# Patient Record
Sex: FEMALE | Race: BLACK OR AFRICAN AMERICAN | Employment: PART TIME | URBAN - METROPOLITAN AREA
[De-identification: names, ages, dates, MRNs, and addresses within clinical notes are randomized per-mention and may not be internally consistent; named-entity substitution may affect disease eponyms.]

---

## 2019-02-26 ENCOUNTER — OFFICE VISIT (OUTPATIENT)
Dept: OBGYN | Facility: CLINIC | Age: 39
End: 2019-02-26
Payer: COMMERCIAL

## 2019-02-26 VITALS
HEART RATE: 98 BPM | SYSTOLIC BLOOD PRESSURE: 115 MMHG | DIASTOLIC BLOOD PRESSURE: 78 MMHG | TEMPERATURE: 98.2 F | WEIGHT: 127.5 LBS | OXYGEN SATURATION: 100 %

## 2019-02-26 DIAGNOSIS — N89.8 VAGINAL DISCHARGE: Primary | ICD-10-CM

## 2019-02-26 DIAGNOSIS — R39.89 ABNORMAL URINE COLOR: ICD-10-CM

## 2019-02-26 LAB
ALBUMIN UR-MCNC: NEGATIVE MG/DL
APPEARANCE UR: NORMAL
BACTERIA #/AREA URNS HPF: ABNORMAL /HPF
BILIRUB UR QL STRIP: NEGATIVE
COLOR UR AUTO: NORMAL
GLUCOSE UR STRIP-MCNC: NEGATIVE MG/DL
HGB UR QL STRIP: NEGATIVE
KETONES UR STRIP-MCNC: NEGATIVE MG/DL
LEUKOCYTE ESTERASE UR QL STRIP: NEGATIVE
NITRATE UR QL: NEGATIVE
NON-SQ EPI CELLS #/AREA URNS LPF: ABNORMAL /LPF
PH UR STRIP: 5.5 PH (ref 5–7)
RBC #/AREA URNS AUTO: ABNORMAL /HPF
SOURCE: NORMAL
SP GR UR STRIP: 1 (ref 1–1.03)
SPECIMEN SOURCE: NORMAL
UROBILINOGEN UR STRIP-MCNC: NORMAL MG/DL (ref 0–2)
WBC #/AREA URNS AUTO: ABNORMAL /HPF
WET PREP SPEC: NORMAL

## 2019-02-26 PROCEDURE — 81001 URINALYSIS AUTO W/SCOPE: CPT | Performed by: OBSTETRICS & GYNECOLOGY

## 2019-02-26 PROCEDURE — 99203 OFFICE O/P NEW LOW 30 MIN: CPT | Performed by: OBSTETRICS & GYNECOLOGY

## 2019-02-26 PROCEDURE — 87210 SMEAR WET MOUNT SALINE/INK: CPT | Performed by: OBSTETRICS & GYNECOLOGY

## 2019-02-26 SDOH — HEALTH STABILITY: MENTAL HEALTH: HOW OFTEN DO YOU HAVE A DRINK CONTAINING ALCOHOL?: NEVER

## 2019-02-26 NOTE — PROGRESS NOTES
"Subjective  39 year old non-pregnant female presents today complaining of a possible yeast infection.  Patient had some white, clumpy vaginal discharge on Jan 20th.  She tried over the counter Monistat which helped after a few days.  She urinates now and can see \"cloudy\" discharge in the toilet.  No itching.  No odor.  Patient is sexually active.  She is actively trying to get pregnant.  No pregnancies.  No dysuria.  No fever or chills.  I recommended she schedule an office visit to discuss the infertility.          ROS: 10 point ROS neg other than the symptoms noted above in the HPI.  History reviewed. No pertinent past medical history.  History reviewed. No pertinent surgical history.  History reviewed. No pertinent family history.  Social History     Tobacco Use     Smoking status: Never Smoker     Smokeless tobacco: Never Used   Substance Use Topics     Alcohol use: No     Frequency: Never         Objective  Vitals: /78   Pulse 98   Temp 98.2  F (36.8  C) (Oral)   Wt 57.8 kg (127 lb 8 oz)   LMP 02/13/2019   SpO2 100%   BMI= There is no height or weight on file to calculate BMI.    General appearance=well developed, well-nourished female  Psych=mood is stable  Skin=no rashes or lesions seen  Neck=overall appearance is normal  Thyroid=no enlargement  Lungs=non-labored breathing, no use of accessory muscles   Abd=soft, Nontender/nondistended, no masses, no signs of hernias, no evidence of hepatosplenomegaly  PELVIC:    External genitalia: normal without lesions or masses  Urethral meatus: no lesions or prolapse noted, normal size  Urethra: no masses, non tender  Bladder: non tender, no fullness  Vagina: normal mucosa and rugae, minimal discharge, wet prep obtained  Cervix: normal without lesion, no cervical motion tenderness, healthy, nulliparous  Uterus: small, mobile, nontender.  Adnexa: non tender, without masses  Rectal: deffered  Ext=no clubbing or cyanosis, no swelling      Assessment  1.)  Vaginal " discharge  2.)  Abnormal urine color  3.)  Inferility      Plan  1.)  Wet prep  2.)  U/A  3.)  Follow-up for infertility visit      20 minutes was spent face to face with the patient today discussing her history, diagnosis, and follow-up plan as noted above.  Over 50% of the visit was spent in counseling and coordination of care.        Nursing notes read and reviewed    Aparna Naik

## 2019-02-26 NOTE — PATIENT INSTRUCTIONS
If you have any questions regarding your visit, Please contact your care team.    Women s Health CLINIC HOURS TELEPHONE NUMBER   Aparna Naik M.D.    Michelle Calderón -         Monday-[facility]  7:00 am - 5 pm Monday's Tuesday- [facility]  8:00am- 5 pm  Wednesday- Off  Thursday- Offf Friday-Am [place], and [facility]  [place] 8:00-11:30 AM  [place] 1:00-5:00 PM [facility]  [address] [place] [address]  [phone] ask for [facility]    Imaging [phone]    [facility]  [address]  [place], [place] [address] [phone]  Imaging [phone]    [facility]  [address] [place], [place] [id number] [phone]  Imaging Scheduling - [phone]     Urgent Care locations:    [facility] Saturday and Sunday   9 am - 5 pm    Monday-Friday   12 pm - 8 pm  Saturday and Sunday   9 am - 5 pm   [phone]       If you need a medication refill, please contact your pharmacy. Please allow 3 business days for your refill to be completed.  As always, Thank you for trusting us with your healthcare needs!

## 2019-03-28 ENCOUNTER — OFFICE VISIT (OUTPATIENT)
Dept: OBGYN | Facility: CLINIC | Age: 39
End: 2019-03-28
Payer: COMMERCIAL

## 2019-03-28 VITALS
HEART RATE: 83 BPM | DIASTOLIC BLOOD PRESSURE: 76 MMHG | SYSTOLIC BLOOD PRESSURE: 114 MMHG | WEIGHT: 123.9 LBS | TEMPERATURE: 98.2 F

## 2019-03-28 DIAGNOSIS — Z31.41 FERTILITY TESTING: Primary | ICD-10-CM

## 2019-03-28 LAB
ERYTHROCYTE [DISTWIDTH] IN BLOOD BY AUTOMATED COUNT: 17.5 % (ref 10–15)
ESTRADIOL SERPL-MCNC: 66 PG/ML
FSH SERPL-ACNC: 6.8 IU/L
HCT VFR BLD AUTO: 49.2 % (ref 35–47)
HGB BLD-MCNC: 15.9 G/DL (ref 11.7–15.7)
LH SERPL-ACNC: 11.7 IU/L
MCH RBC QN AUTO: 28.9 PG (ref 26.5–33)
MCHC RBC AUTO-ENTMCNC: 32.3 G/DL (ref 31.5–36.5)
MCV RBC AUTO: 89 FL (ref 78–100)
PLATELET # BLD AUTO: 198 10E9/L (ref 150–450)
PROGEST SERPL-MCNC: 1.8 NG/ML
PROLACTIN SERPL-MCNC: 59 UG/L (ref 3–27)
RBC # BLD AUTO: 5.51 10E12/L (ref 3.8–5.2)
TSH SERPL DL<=0.005 MIU/L-ACNC: 1.24 MU/L (ref 0.4–4)
WBC # BLD AUTO: 10.3 10E9/L (ref 4–11)

## 2019-03-28 PROCEDURE — 84146 ASSAY OF PROLACTIN: CPT | Performed by: OBSTETRICS & GYNECOLOGY

## 2019-03-28 PROCEDURE — 84403 ASSAY OF TOTAL TESTOSTERONE: CPT | Performed by: OBSTETRICS & GYNECOLOGY

## 2019-03-28 PROCEDURE — 82670 ASSAY OF TOTAL ESTRADIOL: CPT | Performed by: OBSTETRICS & GYNECOLOGY

## 2019-03-28 PROCEDURE — 82627 DEHYDROEPIANDROSTERONE: CPT | Performed by: OBSTETRICS & GYNECOLOGY

## 2019-03-28 PROCEDURE — 84144 ASSAY OF PROGESTERONE: CPT | Performed by: OBSTETRICS & GYNECOLOGY

## 2019-03-28 PROCEDURE — 85027 COMPLETE CBC AUTOMATED: CPT | Performed by: OBSTETRICS & GYNECOLOGY

## 2019-03-28 PROCEDURE — 84443 ASSAY THYROID STIM HORMONE: CPT | Performed by: OBSTETRICS & GYNECOLOGY

## 2019-03-28 PROCEDURE — 87591 N.GONORRHOEAE DNA AMP PROB: CPT | Performed by: OBSTETRICS & GYNECOLOGY

## 2019-03-28 PROCEDURE — 99214 OFFICE O/P EST MOD 30 MIN: CPT | Performed by: OBSTETRICS & GYNECOLOGY

## 2019-03-28 PROCEDURE — 83001 ASSAY OF GONADOTROPIN (FSH): CPT | Performed by: OBSTETRICS & GYNECOLOGY

## 2019-03-28 PROCEDURE — 84270 ASSAY OF SEX HORMONE GLOBUL: CPT | Performed by: OBSTETRICS & GYNECOLOGY

## 2019-03-28 PROCEDURE — 87491 CHLMYD TRACH DNA AMP PROBE: CPT | Performed by: OBSTETRICS & GYNECOLOGY

## 2019-03-28 PROCEDURE — 83002 ASSAY OF GONADOTROPIN (LH): CPT | Performed by: OBSTETRICS & GYNECOLOGY

## 2019-03-28 PROCEDURE — 36415 COLL VENOUS BLD VENIPUNCTURE: CPT | Performed by: OBSTETRICS & GYNECOLOGY

## 2019-03-28 NOTE — PATIENT INSTRUCTIONS
Understanding Fertility Problems: Improving Your Chances  Your doctor may suggest some simple methods to help you get pregnant. Most focus on predicting ovulation--the time when sex has the best chance of success. Your doctor may also advise working on other factors that can affect fertility.  Predicting Ovulation  Conception is only possible when a woman is ovulating. One signal of ovulation is a surge in the hormone LH. Other signals include changes in body temperature and changes in cervical mucus.  Ovulation Predictor Kits  One of the best signals of ovulation is a sudden increase in the hormone LH. A simple urine test called an ovulation predictor kit (available at most [facility]) can help pinpoint this surge. Have sex the day you notice the surge. Keep having sex daily over the next 3 days, or as often as your doctor suggests.  Body Temperature Changes  A woman s resting temperature (basal body temperature) often drops just before ovulation. Your doctor may ask you to take your temperature each morning to pinpoint this change. Have sex the day your temperature drops. Keep having sex daily until the day after your temperature rises again.  Cervical Mucus Changes  Shortly before ovulation a woman s cervical mucus gets clear and stretchy. The mucus also increases in quantity. This makes it easier for sperm to travel through the cervix. Not all women notice these changes. But if you do, begin having sex daily until the mucus thickens again.  Working on Other Factors That Affect Fertility  Certain lifestyle and health habits can affect fertility. Be sure to talk with your doctor about these issues. You may be able to make some simple changes to improve your chances of conception. Keep in mind, though, that it can take time for healthy changes to improve your fertility.  Weight Problems   Being overweight or underweight can affect hormone levels. In women, this can impair ovulation. In men, obesity can decrease  sperm count.  Medications, Supplements, and Herbal Remedies  Medications, supplements, and herbal remedies can affect hormone levels in men and women. They can also affect the quantity and quality of sperm. Be sure to tell your doctor about any substances you take.  Sexually Transmitted Diseases  Sexually transmitted diseases (STDs) can scar the reproductive organs in both men and women. If you ve had an STD, be sure to tell your doctor.  Testicular Heat  A man s testicles are normally a few degrees cooler than the rest of his body. When the testicles are too warm, sperm production may decline. Try to avoid excess heat from things like hot tubs and saunas.  Smoking, Alcohol, and Drugs  Smoking can affect estrogen levels and decrease egg production in women. Men who smoke may have lowered sperm count. Excessive alcohol or drug use can also decrease fertility in both men and women.  Chemical Exposure  Certain chemicals can affect hormone levels. Talk with your doctor if you re regularly exposed to strong chemicals. You should also ask about lubricants used during sex. Some types can be toxic to sperm.  Other Factors  Excessive exercise can decrease hormone production. It can also cause irregular menstruation in women. Ongoing stress is another factor that may affect fertility and cause ovulation problems.

## 2019-03-28 NOTE — PROGRESS NOTES
Subjective   39 year old G0 non-pregnant female presents today complaining of infertility. Patient and  have been actively trying to get pregnant for the last 6 months.  Patient has not been doing ovulation kits.  She did download an nery which tells her when she ovulated.  Patient's  has never fathered any children. No tobacco abuse. No semen analysis or HSG has been done. No history of STD's. Patient's menses are monthly. No ETOH use-[employer]. We discussed the normal menstrual cycle. We discussed timing ovulation kits and intercourse. Will obtain day 3 and 21 labs. Will start Clomid if all labs and studies are normal. Patient verbalizes understanding of plan.  Last menstrual period was 3/15.      Male factor:    General health:  Healthy  Father of prior pregnancies:  No  Boxers:  Yes  Briefs:  No  Semen Analysis:  No                     Occupation of Patient:  [employer]  Chemical or toxin exposure at home or work:  No    Occupation of Partner:  [employer]  Chemical or toxin exposure at home or work:  No    Patient's past medical history:  Medical:  No  Surgical:  No  Medications:  Prenatal vitamin  Allergies:  No      ROS: 10 point ROS neg other than the symptoms noted above in the HPI.  Past Medical History   Diagnosis Date     NO ACTIVE PROBLEMS      Past Surgical History   Procedure Laterality Date     No history of surgery       Family History   Problem Relation Age of Onset     Stroke Mother      42     Hypertension Father      Diabetes Mother      Prostatic CA Paternal Grandfather      Cancer Paternal Aunt      Cervical CA     Cancer Paternal Grandmother      Cervical CA     Thyroid Mother      Circulatory Mother      History   Substance Use Topics     Smoking status: Never Smoker      Smokeless tobacco: Never Used     Alcohol Use: Yes      Comment: On occasion/ Rare         Objective  Vitals: /76  Pulse 76  Wt 126 lb (57.153 kg)  BMI 20.97 kg/m2  LMP 07/10/2014  BMI= Body  mass index is 20.97 kg/(m^2).     Exam done 2/26/19:  General appearance=well developed, well-nourished female  Psych=mood is stable  Skin=no rashes or lesions seen  Neck=overall appearance is normal  Thyroid=no enlargement  Lungs=non-labored breathing, no use of accessory muscles   Abd=soft, Nontender/nondistended, no masses, no signs of hernias, no evidence of hepatosplenomegaly  PELVIC:    External genitalia: normal without lesions or masses  Urethral meatus: no lesions or prolapse noted, normal size  Urethra: no masses, non tender  Bladder: non tender, no fullness  Vagina: normal mucosa and rugae, minimal discharge, wet prep obtained  Cervix: normal without lesion, no cervical motion tenderness, healthy, nulliparous  Uterus: small, mobile, nontender.  Adnexa: non tender, without masses  Rectal: deffered  Ext=no clubbing or cyanosis, no swelling      Assessment  1.)  Infertility management      Plan  1.)  Infertility labs today  2.)  Day 3 and 21 labs  3.)  Semen analysis and HSG=semen information sent to patient   4.)  Start prenatal vitamin    35 minutes was spent face to face with the patient today discussing her history, diagnosis, and follow-up plan as noted above.  Over 50% of the visit was spent in counseling and coordination of care.    Total Visit Time: 35 minutes including counseling and physical exam    Aparna Naik

## 2019-03-29 DIAGNOSIS — E22.1 HYPERPROLACTINEMIA (H): Primary | ICD-10-CM

## 2019-03-29 LAB
C TRACH DNA SPEC QL NAA+PROBE: NEGATIVE
DHEA-S SERPL-MCNC: 140 UG/DL (ref 35–430)
N GONORRHOEA DNA SPEC QL NAA+PROBE: NEGATIVE
SPECIMEN SOURCE: NORMAL
SPECIMEN SOURCE: NORMAL

## 2019-04-02 LAB
SHBG SERPL-SCNC: 53 NMOL/L (ref 30–135)
TESTOST FREE SERPL-MCNC: 0.32 NG/DL (ref 0.13–0.92)
TESTOST SERPL-MCNC: 25 NG/DL (ref 8–60)

## 2019-04-15 ENCOUNTER — TELEPHONE (OUTPATIENT)
Dept: OBGYN | Facility: CLINIC | Age: 39
End: 2019-04-15

## 2019-04-15 NOTE — TELEPHONE ENCOUNTER
Received phone call from patient- patient states was to come in on day 3 for clomid check.  Patient started cycle Friday around 6 pm.  Today is day 4.  Forwarded to provider to advise- have patient be seen tomorrow? Day 5?

## 2019-04-15 NOTE — TELEPHONE ENCOUNTER
Yes, patient should be seen tomorrow.  She will have to be worked in and let her know she will have to wait as I have a full schedule.    Aparna Naik

## 2019-04-16 ENCOUNTER — OFFICE VISIT (OUTPATIENT)
Dept: OBGYN | Facility: CLINIC | Age: 39
End: 2019-04-16
Payer: COMMERCIAL

## 2019-04-16 VITALS
OXYGEN SATURATION: 99 % | TEMPERATURE: 98.1 F | WEIGHT: 122 LBS | DIASTOLIC BLOOD PRESSURE: 66 MMHG | HEART RATE: 84 BPM | SYSTOLIC BLOOD PRESSURE: 101 MMHG

## 2019-04-16 DIAGNOSIS — Z31.41 FERTILITY TESTING: Primary | ICD-10-CM

## 2019-04-16 DIAGNOSIS — R79.89 ELEVATED PROLACTIN LEVEL: ICD-10-CM

## 2019-04-16 PROCEDURE — 99213 OFFICE O/P EST LOW 20 MIN: CPT | Performed by: OBSTETRICS & GYNECOLOGY

## 2019-04-16 NOTE — PATIENT INSTRUCTIONS
If you have any questions regarding your visit, Please contact your care team.    Women s Health CLINIC HOURS TELEPHONE NUMBER   Aparna Naik M.D.    Michelle Calderón -         Monday-[facility]  7:00 am - 5 pm Monday's Tuesday- [facility]  8:00am- 5 pm  Wednesday- Off  Thursday- Offf Friday-Am [place], and [facility]  [place] 8:00-11:30 AM  [place] 1:00-5:00 PM [facility]  [address] [place] [address]  [phone] ask for [facility]    Imaging [phone]    [facility]  [address]  [place], [place] [address] [phone]  Imaging [phone]    [facility]  [address] [place], [place] [id number] [phone]  Imaging Scheduling - [phone]     Urgent Care locations:    [facility] Saturday and Sunday   9 am - 5 pm    Monday-Friday   12 pm - 8 pm  Saturday and Sunday   9 am - 5 pm   [phone]       If you need a medication refill, please contact your pharmacy. Please allow 3 business days for your refill to be completed.  As always, Thank you for trusting us with your healthcare needs!

## 2019-04-16 NOTE — PROGRESS NOTES
Subjective  This 40 y/o female, , LMP was 28 days ago, presents to discuss starting Clomid.  Patient had an abnormal fasting prolactin level.  I had recommended patient get a brain MRI.  She has not done this yet or scheduled it.  We discussed how I need those results before starting Clomid.  She will likely need to see Endocrinology after the results are back as well. She has not done any ovulation kits.  I recommended she buy those and start doing those.  We discussed the importance of this for when/if she is started on Clomid.  Her last menstrual period was .  We reviewed timing of everything again.         ROS: 10 point ROS neg other than the symptoms noted above in the HPI.  History reviewed. No pertinent past medical history.  History reviewed. No pertinent surgical history.  History reviewed. No pertinent family history.  Social History     Tobacco Use     Smoking status: Never Smoker     Smokeless tobacco: Never Used   Substance Use Topics     Alcohol use: No     Frequency: Never         Objective  Vitals: There were no vitals taken for this visit.  BMI= There is no height or weight on file to calculate BMI.    General appearance=well developed, well-nourished female  Psych=mood is stable      Assessment  1.)  Fertility testing  2.)  Hyperprolactinemia      Plan  1.)  Brain MRI scheduled for patient   2.)  Follow-up 4-6 weeks      20 minutes was spent face to face with the patient today discussing her history, diagnosis, and follow-up plan as noted above.  Over 50% of the visit was spent in counseling and coordination of care.        Nursing notes read and reviewed    Aparna Naik

## 2019-04-19 ENCOUNTER — ANCILLARY PROCEDURE (OUTPATIENT)
Dept: MRI IMAGING | Facility: CLINIC | Age: 39
End: 2019-04-19
Attending: OBSTETRICS & GYNECOLOGY
Payer: COMMERCIAL

## 2019-04-19 DIAGNOSIS — E22.1 HYPERPROLACTINEMIA (H): ICD-10-CM

## 2019-04-19 PROCEDURE — 70553 MRI BRAIN STEM W/O & W/DYE: CPT | Performed by: RADIOLOGY

## 2019-04-19 PROCEDURE — A9585 GADOBUTROL INJECTION: HCPCS | Mod: JW | Performed by: OBSTETRICS & GYNECOLOGY

## 2019-04-19 RX ORDER — GADOBUTROL 604.72 MG/ML
7.5 INJECTION INTRAVENOUS ONCE
Status: COMPLETED | OUTPATIENT
Start: 2019-04-19 | End: 2019-04-19

## 2019-04-19 RX ADMIN — GADOBUTROL 6.5 ML: 604.72 INJECTION INTRAVENOUS at 15:17

## 2019-04-22 ENCOUNTER — TELEPHONE (OUTPATIENT)
Dept: OBGYN | Facility: CLINIC | Age: 39
End: 2019-04-22

## 2019-04-22 DIAGNOSIS — R79.89 ELEVATED PROLACTIN LEVEL: Primary | ICD-10-CM

## 2019-04-22 NOTE — TELEPHONE ENCOUNTER
Reason for Call:  Request for results:    Name of test or procedure: MRI    Date of test of procedure: [place]    Location of the test or procedure: last week    OK to leave the result message on voice mail or with a family member? NO    Phone number Patient can be reached at:  Cell number on file:    Telephone Information:   Mobile [phone]       Additional comments:     Call taken on 4/22/2019 at 1:01 PM by Bianka Francis

## 2019-04-22 NOTE — TELEPHONE ENCOUNTER
Pattient informed of results per Dr. Sunshine notes. Patient given phone number to schedule with Endocrinology.       Marlen Arce CMA  April 22, 2019  3:22 PM

## 2019-06-23 ENCOUNTER — TRANSFERRED RECORDS (OUTPATIENT)
Dept: HEALTH INFORMATION MANAGEMENT | Facility: CLINIC | Age: 39
End: 2019-06-23

## 2019-07-15 ENCOUNTER — PRE VISIT (OUTPATIENT)
Dept: ENDOCRINOLOGY | Facility: CLINIC | Age: 39
End: 2019-07-15

## 2019-07-15 NOTE — TELEPHONE ENCOUNTER
Left message for pt to call back to clinic to go over Pre-Visit questions for upcoming appointment on 7/23/19 with Dr Cuello.   Holley Ansari, CMA

## 2020-10-15 ENCOUNTER — TRANSFERRED RECORDS (OUTPATIENT)
Dept: HEALTH INFORMATION MANAGEMENT | Facility: CLINIC | Age: 40
End: 2020-10-15

## 2020-11-09 ENCOUNTER — TRANSFERRED RECORDS (OUTPATIENT)
Dept: HEALTH INFORMATION MANAGEMENT | Facility: CLINIC | Age: 40
End: 2020-11-09